# Patient Record
Sex: FEMALE | Race: WHITE | NOT HISPANIC OR LATINO | Employment: UNEMPLOYED | URBAN - METROPOLITAN AREA
[De-identification: names, ages, dates, MRNs, and addresses within clinical notes are randomized per-mention and may not be internally consistent; named-entity substitution may affect disease eponyms.]

---

## 2018-03-07 ENCOUNTER — OFFICE VISIT (OUTPATIENT)
Dept: ALLERGY | Facility: CLINIC | Age: 38
End: 2018-03-07
Payer: OTHER GOVERNMENT

## 2018-03-07 VITALS
BODY MASS INDEX: 24.27 KG/M2 | WEIGHT: 151 LBS | DIASTOLIC BLOOD PRESSURE: 90 MMHG | TEMPERATURE: 99 F | SYSTOLIC BLOOD PRESSURE: 130 MMHG | HEIGHT: 66 IN

## 2018-03-07 DIAGNOSIS — J30.0 CHRONIC VASOMOTOR RHINITIS: Primary | ICD-10-CM

## 2018-03-07 DIAGNOSIS — Z87.09 PERSONAL HISTORY OF ASTHMA: ICD-10-CM

## 2018-03-07 PROCEDURE — 99203 OFFICE O/P NEW LOW 30 MIN: CPT | Mod: PBBFAC | Performed by: ALLERGY & IMMUNOLOGY

## 2018-03-07 PROCEDURE — 95004 PERQ TESTS W/ALRGNC XTRCS: CPT | Mod: S$PBB,,, | Performed by: ALLERGY & IMMUNOLOGY

## 2018-03-07 PROCEDURE — 99204 OFFICE O/P NEW MOD 45 MIN: CPT | Mod: 25,S$PBB,, | Performed by: ALLERGY & IMMUNOLOGY

## 2018-03-07 PROCEDURE — 95004 PERQ TESTS W/ALRGNC XTRCS: CPT | Mod: PBBFAC | Performed by: ALLERGY & IMMUNOLOGY

## 2018-03-07 PROCEDURE — 99999 PR PBB SHADOW E&M-NEW PATIENT-LVL III: CPT | Mod: PBBFAC,,, | Performed by: ALLERGY & IMMUNOLOGY

## 2018-03-07 RX ORDER — MONTELUKAST SODIUM 10 MG/1
10 TABLET ORAL NIGHTLY
COMMUNITY

## 2018-03-07 RX ORDER — FLUTICASONE PROPIONATE 50 MCG
1 SPRAY, SUSPENSION (ML) NASAL DAILY
COMMUNITY
End: 2018-03-07 | Stop reason: SDUPTHER

## 2018-03-07 RX ORDER — ALBUTEROL SULFATE 90 UG/1
2 AEROSOL, METERED RESPIRATORY (INHALATION) EVERY 6 HOURS PRN
COMMUNITY

## 2018-03-07 RX ORDER — AZELASTINE 1 MG/ML
1 SPRAY, METERED NASAL 2 TIMES DAILY
COMMUNITY

## 2018-03-07 RX ORDER — CETIRIZINE HYDROCHLORIDE 10 MG/1
10 TABLET ORAL DAILY
COMMUNITY

## 2018-03-07 RX ORDER — FLUTICASONE PROPIONATE 50 MCG
2 SPRAY, SUSPENSION (ML) NASAL 2 TIMES DAILY
Qty: 3 BOTTLE | Refills: 2 | Status: SHIPPED | OUTPATIENT
Start: 2018-03-07 | End: 2018-06-05

## 2018-03-07 NOTE — PROGRESS NOTES
"ALLERGY & IMMUNOLOGY CLINIC -  INITIAL CONSULTATION       HISTORY OF PRESENT ILLNESS     Patient ID: Lakia Lopez is a 37 y.o. female    CC: asthma    HPI: 37 year-old woman here for initial evaluation.    Personal history of "allergic asthma": Diagnosed ten years ago around age 27. Symptoms include chest tightness, coughing. She denies wheezing. Triggers include cigarette smoke, harsh chemicals, cat litterbox ammonia smells, mold, smell of brewing coffee. Symptoms tend to come on in fall and spring. Current medications include albuterol, singulair. She had a steroid shot most recently in December 2017. She has been on steroid inhalers previously, but has been off these for two years. Moved to [facility] in November. Immediately upon arrival, symptoms flared. Never admitted to hospital for asthma.     Rhinitis: Congestion, sneezing, ear fullness and early morning post-nasal drip. Symptoms are perennial with seasonal exacerbation. She takes singulair, zyrtec daily, flonase 1 SEN daily (ran out), azelastine 1 SEN daily. She reports poor sleep and nausea with azelastine.  The medications help symptoms significantly.     Reports intermittent heartburn, not bad enough to merit daily medication    Avoids:   Cinnamon candies - cause hives  Banana and walnuts cause mouth tingling    She has never had allergy testing before.     REVIEW OF SYSTEMS     CONST: no F/C/NS, no unintentional weight changes  NEURO:  no weakness, no paresthesias  EYES: no discharge, + pruritus, no erythema  EARS: no hearing loss, no sensation of fullness  NOSE: see HPI  PULM: no SOB, no wheezing, no cough  CV: no CP, no palpitations, no leg swelling  GI: no dysphagia, no heartburn, no pain, no N/V/D, no BRBPR/melena  URO: no dysuria, no hematuria, no nocturia  MSK: + intermittent joint pain, no muscle pain  DERM: no rashes, no skin breaks     MEDICAL HISTORY     MedHx: active problems reviewed  SurgHx: tonsillectomy, D&C  SocHx: no " tob/EtOH/illicits  FamHx: mom with history of TB and lots of tobacco use, son has had allergy testing that was possibly positive for cockroaches, son also had a probable anaphylaxis reaction at age 2 that included wheezing  Allergies: see below  Medications: MAR reviewed  Vaccines: UTD    H/o Asthma: see HPI  H/o Eczema: denies  H/o Rhinitis: see HPI  Oral Allergy: see HPI  Food Allergy: see HPI   Venom Allergy: pruritus and local swelling with mosquitos  Latex Allergy: adhesives cause rash  Other Allergies: denies  Env/Occ: denies any environmental or occupational exposures     PHYSICAL EXAM     VS: There were no vitals taken for this visit.  GENERAL: AAOx4, NAD, well-appearing, cooperative  EYES: PERRL, EOMI, no conjunctival injection, no discharge, no infraorbital shiners  EARS: external auditory canals normal B/L, TM normal B/L  NOSE: NT 2+ and pink B/L, no stringing mucous, no polyps  ORAL: MMM, no ulcers, no thrush, no cobblestoning  NECK: supple, trachea midline, no thyromegaly, no LAD  LUNGS: CTAB, no w/r/c, no increased WOB  HEART: RRR, normal S1/S2, no m/g/r  EXTREMITIES: +2 distal pulses, no c/c/e  LYMPHATICS: no cervical/submandibular LAD, no axillary LAD, no inguinal LAD  DERM: no rashes, no skin breaks, no dystrophic fingernails  NEURO: normal gait, no facial asymmetry     ALLERGEN TESTING     Skin Prick: Testing done today 3/7/18 for common [place] [facility] aeroallergens, with 1+ to mulberry but no other positive results. Good saline (neg) and histamine (3+) controls.     Immunocaps: never done     PULMONARY FUNCTION     PFTs: never done     ASSESSMENT & PLAN     Lakia Lopez is a 37 y.o. female with     Chronic non-allergic vs mixed rhinitis, based on largely negative skin prick test done today   -Flonase up to 2 sprays each nostril twice daily   -Azelastine up to 1 spray each nostril twice daily   -Proper nasal spray technique reviewed   -Can stop zyrtec and singulair as these are unlikely to help  non-allergic symptoms    Personal history of asthma   -I recommend spirometry at baseline and then again when symptomatic to confirm this diagnosis.    -Recommend aggressive control of rhinitis symptoms, as uncontrolled upper airway symptoms can trigger lower airway symptoms   -Patient has pulmonology referral and wishes to discuss this plan further with pulm.     RTC as needed.     Dominique Cleary MD  Allergy/Immunology Fellow